# Patient Record
Sex: MALE | Race: WHITE
[De-identification: names, ages, dates, MRNs, and addresses within clinical notes are randomized per-mention and may not be internally consistent; named-entity substitution may affect disease eponyms.]

---

## 2018-03-19 ENCOUNTER — HOSPITAL ENCOUNTER (OUTPATIENT)
Dept: HOSPITAL 88 - DX | Age: 38
End: 2018-03-19
Attending: SPECIALIST
Payer: MEDICARE

## 2018-03-19 DIAGNOSIS — S83.242A: ICD-10-CM

## 2018-03-19 DIAGNOSIS — S46.091A: ICD-10-CM

## 2018-03-19 DIAGNOSIS — S43.431A: Primary | ICD-10-CM

## 2018-03-19 PROCEDURE — 73721 MRI JNT OF LWR EXTRE W/O DYE: CPT

## 2018-03-19 PROCEDURE — 73222 MRI JOINT UPR EXTREM W/DYE: CPT

## 2018-03-19 PROCEDURE — 23350 INJECTION FOR SHOULDER X-RAY: CPT

## 2018-03-19 PROCEDURE — 77002 NEEDLE LOCALIZATION BY XRAY: CPT

## 2018-03-19 NOTE — DIAGNOSTIC IMAGING REPORT
MRI of the right shoulder with contrast.



History:  Shoulder pain. Labral lesion. Pain worse with heavy lifting.



Comparison:  None



Technique: Multiplanar multisequence MRI of the right shoulder after the

administration of intra-articular gadolinium contrast material.



Findings:

Rotator cuff: There is mild rotator cuff tendinosis with mild articular sided

fraying involving the anterior fibers of the supraspinatus and infraspinatus

tendons at the humeral insertion site. No rotator cuff tear, muscle atrophy or

retraction.



Osseous acromion complex: There is a type II acromion with mild lateral

downsloping. There is mild degenerative arthrosis at the acromioclavicular

joint with undersurface spurring and narrowing of the supraspinatus tendon

outlet.



Glenohumeral joint: There is a nondisplaced superior labral tear extending from

anterior to posterior. This is best seen on coronal series 4 image 11 through

15. The capsular structures are intact. The articular cartilage surfaces are

intact. The humeral head is well-seated in the glenoid fossa. Gadolinium

contrast material is seen filling the glenohumeral joint and extending into the

subcoracoid space.



Biceps tendon: There is intra-articular biceps tendinosis with fraying at the

biceps anchor.



Other findings: Negative for muscle denervation or osseous fracture.



Impression:

Mild rotator cuff tendinosis with mild articular sided fraying involving the

anterior fibers of the supraspinatus and infraspinatus tendons at the humeral

insertion site



Nondisplaced superior labral tear extending from anterior to posterior



Intra-articular biceps tendinosis with fraying at the biceps anchor.



Signed by: Dr. Naveed Lundberg M.D. on 3/19/2018 3:36 PM

## 2018-03-19 NOTE — DIAGNOSTIC IMAGING REPORT
Left knee MRI without contrast.



History: Knee pain. Meniscus tear. Decreased range of motion. Pain not

responding to conservative management.



Comparison: None.



Technique: Multiplanar multi-sequence MRI of the knee without contrast.



Findings: 



Medial compartment:  There is degeneration and free edge fraying of the medial

meniscus. No meniscal tear, cartilage abnormality, or MCL tear.



Lateral compartment: No meniscal tear or cartilage abnormality.  The LCL

complex is normal.



Intercondylar notch:  The ACL and PCL are intact.



Patellofemoral compartment:  No chondromalacia or patellar dislocation.



Extensor mechanism:  There is mild proximal patellar tendinosis. The quadriceps

and patellar tendons are otherwise intact.  



Other findings:  There is a joint effusion and synovitis.  There is no acute

fracture, subluxation or avascular necrosis.



IMPRESSION:  

Degeneration and free edge fraying of the medial meniscus. No meniscal tear is

seen.



Mild proximal patellar tendinosis without tear or retraction.



Signed by: Dr. Naveed Lundberg M.D. on 3/19/2018 3:39 PM

## 2018-03-19 NOTE — DIAGNOSTIC IMAGING REPORT
PROCEDURE:FLUORO GUID ARTHRO NEEDLE PLMT

 

COMPARISON:None.

 

INDICATIONS:Right shoulder pain.

 

TECHNIQUE:A joint injection was performed in the usual sterile manner 

after obtaining informed consent.

 

FINDINGS:

 images of the right shoulder were obtained. The right shoulder 

was prepped and draped in the usual sterile fashion. 1% lidocaine was 

infused into the subcutaneous tissues for local anesthesia. Utilizing 

direct fluoroscopic guidance, a 22 gauge spinal needle was advanced 

into the right glenohumeral joint. Contrast opacification of the 

glenohumeral joint space was confirmed with 5 cc Isovue-300. A mixture 

of gadolinium 0.1 mg and 10 cc of sterile saline was infused. Upon 

completion, the needle was removed. A sterile dressing was applied. The 

contrast was mobilized within the joint space utilizing passive range 

of motion.

 

There no immediate complications. The patient tolerated the procedure 

well. The patient was transferred in stable unchanged condition to the 

MRI suite for further imaging with an MRI arthrogram.

 

CONCLUSION:

Successful right shoulder contrast injection utilizing fluoroscopic 

guidance for an MR arthrogram.

 

 

Dictated by:  Samuel Hdz M.D. on 3/19/2018 at 15:27     

Electronically approved by:  Samuel Hdz M.D. on 3/19/2018 at 15:27

## 2018-04-11 ENCOUNTER — HOSPITAL ENCOUNTER (OUTPATIENT)
Dept: HOSPITAL 88 - OR | Age: 38
Discharge: HOME | End: 2018-04-11
Attending: SPECIALIST
Payer: MEDICARE

## 2018-04-11 DIAGNOSIS — M65.811: ICD-10-CM
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
## 2018-04-11 DIAGNOSIS — S43.431A: Primary | ICD-10-CM

## 2018-04-11 DIAGNOSIS — M75.111: ICD-10-CM

## 2018-04-11 DIAGNOSIS — M22.2X2: ICD-10-CM

## 2018-04-11 DIAGNOSIS — N20.0: ICD-10-CM

## 2018-04-11 PROCEDURE — 29807 SHO ARTHRS SRG RPR SLAP LES: CPT

## 2018-04-11 NOTE — XMS REPORT
Patient Summary Document

 Created on: 2018



AMY PIERSON

External Reference #: [id number]

: 1980

Sex: Male



Demographics







 Address  [address] [place]  [address]

 

 Home Phone  [phone]

 

 Preferred Language  Unknown

 

 Marital Status  Unknown

 

 Alevism Affiliation  Unknown

 

 Race  Unknown

 

 Additional Race(s)  

 

 Ethnic Group  Unknown





Author







 [facility]  [facility]

 

 Address  Unknown

 

 Phone  Unavailable







Care Team Providers







 Care Team Member Name  Role  Phone

 

 RENÉ HILLS  Unavailable  Unavailable







Problems

This patient has no known problems.



Allergies, Adverse Reactions, Alerts

This patient has no known allergies or adverse reactions.



Medications

This patient has no known medications.



Results







 Test Description  Test Time  Test Comments  Text Results  Atomic Results  
Result Comments









 INJECTION ARTHROGRAM SHOULDER            [place] [address]      Patient Name: 
AMY PIERSON   MR #: [id number]    : 1980 Age/Sex: 37/M  Acct #: 
[id number] Req #: [id number]  [facility] [place] Physician:     Ordered by: RENÉ HILLS MD  Report #: [id number]   Location: DX  Room/Bed:     ________________________
___________________________________________________________________________    
Procedure: [id number] IR/INJECTION ARTHROGRAM SHOULDER  Exam Date: 18     
                       Exam Time: 1328       REPORT STATUS: Signed    PROCEDURE
:   FLUORO GUID ARTHRO NEEDLE PLMT       COMPARISON:   None.       INDICATIONS:
   Right shoulder pain.       TECHNIQUE:   A joint injection was performed in 
the usual sterile manner    after obtaining informed consent.       FINDINGS:  
     images of the right shoulder were obtained. The right shoulder    was 
prepped and draped in the usual sterile fashion. 1% lidocaine was    infused 
into the subcutaneous tissues for local anesthesia. Utilizing    direct 
fluoroscopic guidance, a 22 gauge spinal needle was advanced    into the right 
glenohumeral joint. Contrast opacification of the    glenohumeral joint space 
was confirmed with 5 cc Isovue-300. A mixture    of gadolinium 0.1 mg and 10 cc 
of sterile saline was infused. Upon    completion, the needle was removed. A 
sterile dressing was applied. The    contrast was mobilized within the joint 
space utilizing passive range    of motion.       There no immediate 
complications. The patient tolerated the procedure    well. The patient was 
transferred in stable unchanged condition to the    MRI suite for further 
imaging with an MRI arthrogram.       CONCLUSION:      Successful right 
shoulder contrast injection utilizing fluoroscopic    guidance for an MR 
arthrogram.           Dictated by:  Shana Gtz M.D. on 3/19/2018 at 15:27    
    Electronically approved by:  Shana Gtz M.D. on 3/19/2018 at 15:27       
         Dictated By: SHANA GTZ MD  Electronically Signed By: SHANA GTZ MD 
on 18  Transcribed By: SILAS on 18       COPY TO:   
RENÉ HILLS MD           

 

 MRI SHOULDER RIGHT W            [place] [address]      Patient Name: AMY PIERSON   MR #: [id number]    : 1980 Age/Sex: 37/M  Acct #: 
[id number] Req #: [id number]  Adm Physician:     Ordered by: RENÉ HILLS MD  Report #: [id number]   Location:   Room/Bed:     ________________________
___________________________________________________________________________    
Procedure: [id number] MRI/MRI SHOULDER RIGHT W  Exam Date:                      
       Exam Time:        REPORT STATUS: Signed       MRI of the right shoulder 
with contrast.      History:  Shoulder pain. Labral lesion. Pain worse with 
heavy lifting.      Comparison:  None      Technique: Multiplanar multisequence 
MRI of the right shoulder after the   administration of intra-articular 
gadolinium contrast material.      Findings:   Rotator cuff: There is mild 
rotator cuff tendinosis with mild articular sided   fraying involving the 
anterior fibers of the supraspinatus and infraspinatus   tendons at the humeral 
insertion site. No rotator cuff tear, muscle atrophy or   retraction.      
Osseous acromion complex: There is a type II acromion with mild lateral   
downsloping. There is mild degenerative arthrosis at the acromioclavicular   
joint with undersurface spurring and narrowing of the supraspinatus tendon   
outlet.      Glenohumeral joint: There is a nondisplaced superior labral tear 
extending from   anterior to posterior. This is best seen on coronal series 4 
image 11 through   15. The capsular structures are intact. The articular 
cartilage surfaces are   intact. The humeral head is well-seated in the glenoid 
fossa. Gadolinium   contrast material is seen filling the glenohumeral joint 
and extending into the   subcoracoid space.      Biceps tendon: There is intra-
articular biceps tendinosis with fraying at the   biceps anchor.      Other 
findings: Negative for muscle denervation or osseous fracture.      Impression:
   Mild rotator cuff tendinosis with mild articular sided fraying involving the
   anterior fibers of the supraspinatus and infraspinatus tendons at the 
humeral   insertion site      Nondisplaced superior labral tear extending from 
anterior to posterior      Intra-articular biceps tendinosis with fraying at 
the biceps anchor.      Signed by: Dr. Naveed Lundberg M.D. on 3/19/2018 3:36 PM  
      Dictated By: NAVEED LUNDBERG MD, MD  Electronically Signed By: NAVEED LUNDBERG MD, MD on 18 153  Transcribed By: ANCA on 18 1536       COPY TO:
   RENÉ HILLS MD           

 

 MRI KNEE LEFT [place] [address]      Patient Name: AMY PIERSON
   MR #: [id number]    : 1980 Age/Sex: 37/M  Acct #: [id number] Req #
: [id number]  Adm Physician:     Ordered by: RENÉ HILLS MD  Report #: 
[id number]   Location: DX  Room/Bed:     ________________________________________
___________________________________________________________    Procedure: [id number]
[id number] MRI/MRI KNEE LEFT WO  Exam Date:                             Exam Time:   
     REPORT STATUS: Signed    Left knee MRI without contrast.      History: 
Knee pain. Meniscus tear. Decreased range of motion. Pain not   responding to 
conservative management.      Comparison: None.      Technique: Multiplanar 
multi-sequence MRI of the knee without contrast.      Findings:       Medial 
compartment:  There is degeneration and free edge fraying of the medial   
meniscus. No meniscal tear, cartilage abnormality, or MCL tear.      Lateral 
compartment: No meniscal tear or cartilage abnormality.  The LCL   complex is 
normal.      Intercondylar notch:  The ACL and PCL are intact.      
Patellofemoral compartment:  No chondromalacia or patellar dislocation.      
Extensor mechanism:  There is mild proximal patellar tendinosis. The quadriceps
   and patellar tendons are otherwise intact.        Other findings:  There is 
a joint effusion and synovitis.  There is no acute   fracture, subluxation or 
avascular necrosis.      IMPRESSION:     Degeneration and free edge fraying of 
the medial meniscus. No meniscal tear is   seen.      Mild proximal patellar 
tendinosis without tear or retraction.      Signed by: Dr. Naveed Lundberg M.D. on 
3/19/2018 3:39 PM        Dictated By: NAVEED LUNDBERG MD, MD  Electronically 
Signed By: NAVEED LUNDBERG MD, MD on 18  Transcribed By: ANCA on 1539       COPY TO:   RENÉ HILLS MD           

 

 FLURO GUIDE NEEDLE [facility]/[place] [address]      Patient Name: 
AMY PIERSON   MR #: [id number]    : 1980 Age/Sex: 37/M  Acct #: 
[id number] Req #: [id number]  Adm Physician:     Ordered by: RENÉ HILLS MD  Report #: [id number]   Location: DX  Room/Bed:     ________________________
___________________________________________________________________________    
Procedure: [id number] DX/FLURO GUIDE NEEDLE PLMT/LOC DE  Exam Date:             
                Exam Time:        REPORT STATUS: Signed    PROCEDURE:   FLUORO 
GUID ARTHRO NEEDLE PLMT       COMPARISON:   None.       INDICATIONS:   Right 
shoulder pain.       TECHNIQUE:   A joint injection was performed in the usual 
sterile manner    after obtaining informed consent.       FINDINGS:       
images of the right shoulder were obtained. The right shoulder    was prepped 
and draped in the usual sterile fashion. 1% lidocaine was    infused into the 
subcutaneous tissues for local anesthesia. Utilizing    direct fluoroscopic 
guidance, a 22 gauge spinal needle was advanced    into the right glenohumeral 
joint. Contrast opacification of the    glenohumeral joint space was confirmed 
with 5 cc Isovue-300. A mixture    of gadolinium 0.1 mg and 10 cc of sterile 
saline was infused. Upon    completion, the needle was removed. A sterile 
dressing was applied. The    contrast was mobilized within the joint space 
utilizing passive range    of motion.       There no immediate complications. 
The patient tolerated the procedure    well. The patient was transferred in 
stable unchanged condition to the    MRI suite for further imaging with an MRI 
arthrogram.       CONCLUSION:      Successful right shoulder contrast injection 
utilizing fluoroscopic    guidance for an MR arthrogram.           Dictated by:
  Shana Gtz M.D. on 3/19/2018 at 15:27        Electronically approved by:  
Shana Gtz M.D. on 3/19/2018 at 15:27                Dictated By: SHANA GTZ MD  Electronically Signed By: SHANA GTZ MD on 18 1527  Transcribed By: 
SILAS on 18 1527       COPY TO:   RENÉ HILLS MD

## 2018-04-11 NOTE — OPERATIVE REPORT
DATE OF PROCEDURE:  April 11, 2018 



PREOPERATIVE DIAGNOSIS:  Right shoulder labral tear.



POSTOPERATIVE DIAGNOSES:

1. Right shoulder labral tear.

2. Right shoulder synovitis.

3. Right shoulder partial rotator cuff tear.



OPERATION PROCEDURE PERFORMED:  Patient underwent:

1. Right shoulder examination under anesthesia.

2. Right shoulder arthroscopy.

3. Right shoulder arthroscopic debridement of synovitis.

4. Right shoulder arthroscopic debridement of a partial rotator cuff 

tear.

5. Right shoulder arthroscopic superior labral repair. 



ASSISTANT:  Wendy Bridges.



ANESTHESIA:  General endotracheal intubation anesthesia as well as regional 

block.



IV FLUIDS:  As per the anesthesia record.



BLOOD LOSS:  Nominal.



DESCRIPTION OF PROCEDURE:  Mr. Larson was taken to the operating room, 

placed in supine position on the operating table.  Following the induction 

of general anesthesia and endotracheal intubation, the patient's operating 

room table was converted to a beach chair type position.  Examination of 

the right upper extremity demonstrated no gross abnormalities.  The patient 

had full passive range of motion of the shoulder joint without evidence of 

instability.  The patient's upper extremity was prepped and draped in 

standard surgical fashion.  Standard anterior and posterolateral portals 

were created without difficulty.  Scope was placed within the shoulder 

joint atraumatically.  Examination of glenohumeral articulation 

demonstrated no significant evidence of chondromalacia.  There was diffuse 

synovitis throughout the shoulder joint.  There were no loose bodies in the 

shoulder joint.  There was a partial thickness rotator cuff tear comprising 

less than 50% of the insertion site into the greater tuberosity.  An 

anterior portal was created through an outside-in technique.  A probe was 

placed in the shoulder joint.  Examination of the biceps tendon found that 

it was intact.  There was fraying and tearing of the labrum along the 

superior aspect of the glenoid.  There was an anterior labral foramen.  An 

elevator was used to elevate the torn portion of the labrum from the 

superior aspect of the glenoid.  A shaver was then used to debride the 

insertion site to a bleeding bony bed.  Two anchors were then inserted into 

the superior rim of the glenoid.  The sutures from those anchors were then 

woven about the labrum and labrum was tied firmly into its normal insertion 

site.  A probe was placed within the shoulder joint and probing of the 

labrum demonstrated firm reattachment of the labrum to the glenoid.  A 

shaver was then used to debride the synovitis.  The rotator cuff tear was 

also debrided at this time.  The shoulder was deflated of its sterile 

normal saline.  The portal sites were closed.  Sterile dressings were 

applied and the patient was provided a shoulder immobilizer, awakened and 

taken to post anesthesia care in stable condition.









DD:  04/11/2018 10:54

DT:  04/11/2018 12:04

Job#:  [id number]